# Patient Record
Sex: FEMALE | Race: WHITE | Employment: OTHER | URBAN - METROPOLITAN AREA
[De-identification: names, ages, dates, MRNs, and addresses within clinical notes are randomized per-mention and may not be internally consistent; named-entity substitution may affect disease eponyms.]

---

## 2019-03-27 ENCOUNTER — APPOINTMENT (OUTPATIENT)
Dept: GENERAL RADIOLOGY | Facility: CLINIC | Age: 84
End: 2019-03-27
Attending: EMERGENCY MEDICINE
Payer: COMMERCIAL

## 2019-03-27 ENCOUNTER — HOSPITAL ENCOUNTER (EMERGENCY)
Facility: CLINIC | Age: 84
Discharge: HOME | End: 2019-03-27
Attending: EMERGENCY MEDICINE | Admitting: EMERGENCY MEDICINE
Payer: COMMERCIAL

## 2019-03-27 VITALS
WEIGHT: 165 LBS | SYSTOLIC BLOOD PRESSURE: 146 MMHG | BODY MASS INDEX: 31.15 KG/M2 | HEIGHT: 61 IN | DIASTOLIC BLOOD PRESSURE: 97 MMHG | RESPIRATION RATE: 16 BRPM | OXYGEN SATURATION: 97 % | HEART RATE: 93 BPM | TEMPERATURE: 98.1 F

## 2019-03-27 DIAGNOSIS — J10.1 INFLUENZA A: ICD-10-CM

## 2019-03-27 LAB
ALBUMIN SERPL-MCNC: 3.6 G/DL (ref 3.4–5)
ALP SERPL-CCNC: 84 U/L (ref 40–150)
ALT SERPL W P-5'-P-CCNC: 17 U/L (ref 0–50)
ANION GAP SERPL CALCULATED.3IONS-SCNC: 8 MMOL/L (ref 3–14)
AST SERPL W P-5'-P-CCNC: 22 U/L (ref 0–45)
BASOPHILS # BLD AUTO: 0 10E9/L (ref 0–0.2)
BASOPHILS NFR BLD AUTO: 0.2 %
BILIRUB SERPL-MCNC: 0.7 MG/DL (ref 0.2–1.3)
BUN SERPL-MCNC: 14 MG/DL (ref 7–30)
CALCIUM SERPL-MCNC: 8.7 MG/DL (ref 8.5–10.1)
CHLORIDE SERPL-SCNC: 97 MMOL/L (ref 94–109)
CO2 SERPL-SCNC: 28 MMOL/L (ref 20–32)
CREAT SERPL-MCNC: 0.73 MG/DL (ref 0.52–1.04)
DIFFERENTIAL METHOD BLD: ABNORMAL
EOSINOPHIL # BLD AUTO: 0 10E9/L (ref 0–0.7)
EOSINOPHIL NFR BLD AUTO: 0.2 %
ERYTHROCYTE [DISTWIDTH] IN BLOOD BY AUTOMATED COUNT: 12.2 % (ref 10–15)
FLUAV+FLUBV AG SPEC QL: NEGATIVE
FLUAV+FLUBV AG SPEC QL: POSITIVE
GFR SERPL CREATININE-BSD FRML MDRD: 76 ML/MIN/{1.73_M2}
GLUCOSE SERPL-MCNC: 111 MG/DL (ref 70–99)
HCT VFR BLD AUTO: 37.1 % (ref 35–47)
HGB BLD-MCNC: 12.9 G/DL (ref 11.7–15.7)
IMM GRANULOCYTES # BLD: 0 10E9/L (ref 0–0.4)
IMM GRANULOCYTES NFR BLD: 0.2 %
INTERPRETATION ECG - MUSE: NORMAL
LYMPHOCYTES # BLD AUTO: 0.4 10E9/L (ref 0.8–5.3)
LYMPHOCYTES NFR BLD AUTO: 9.8 %
MCH RBC QN AUTO: 31.6 PG (ref 26.5–33)
MCHC RBC AUTO-ENTMCNC: 34.8 G/DL (ref 31.5–36.5)
MCV RBC AUTO: 91 FL (ref 78–100)
MONOCYTES # BLD AUTO: 0.3 10E9/L (ref 0–1.3)
MONOCYTES NFR BLD AUTO: 6.2 %
NEUTROPHILS # BLD AUTO: 3.7 10E9/L (ref 1.6–8.3)
NEUTROPHILS NFR BLD AUTO: 83.4 %
NRBC # BLD AUTO: 0 10*3/UL
NRBC BLD AUTO-RTO: 0 /100
PLATELET # BLD AUTO: 198 10E9/L (ref 150–450)
POTASSIUM SERPL-SCNC: 3.1 MMOL/L (ref 3.4–5.3)
PROT SERPL-MCNC: 7.3 G/DL (ref 6.8–8.8)
RBC # BLD AUTO: 4.08 10E12/L (ref 3.8–5.2)
SODIUM SERPL-SCNC: 133 MMOL/L (ref 133–144)
SPECIMEN SOURCE: ABNORMAL
TROPONIN I SERPL-MCNC: 0.04 UG/L (ref 0–0.04)
WBC # BLD AUTO: 4.4 10E9/L (ref 4–11)

## 2019-03-27 PROCEDURE — 71046 X-RAY EXAM CHEST 2 VIEWS: CPT

## 2019-03-27 PROCEDURE — 80053 COMPREHEN METABOLIC PANEL: CPT | Performed by: EMERGENCY MEDICINE

## 2019-03-27 PROCEDURE — 85025 COMPLETE CBC W/AUTO DIFF WBC: CPT | Performed by: EMERGENCY MEDICINE

## 2019-03-27 PROCEDURE — 94640 AIRWAY INHALATION TREATMENT: CPT

## 2019-03-27 PROCEDURE — 25000125 ZZHC RX 250: Performed by: EMERGENCY MEDICINE

## 2019-03-27 PROCEDURE — 93005 ELECTROCARDIOGRAM TRACING: CPT

## 2019-03-27 PROCEDURE — 25000132 ZZH RX MED GY IP 250 OP 250 PS 637: Performed by: EMERGENCY MEDICINE

## 2019-03-27 PROCEDURE — 99285 EMERGENCY DEPT VISIT HI MDM: CPT | Mod: 25

## 2019-03-27 PROCEDURE — 84484 ASSAY OF TROPONIN QUANT: CPT | Performed by: EMERGENCY MEDICINE

## 2019-03-27 PROCEDURE — 87804 INFLUENZA ASSAY W/OPTIC: CPT | Mod: 91 | Performed by: EMERGENCY MEDICINE

## 2019-03-27 RX ORDER — OSELTAMIVIR PHOSPHATE 75 MG/1
75 CAPSULE ORAL ONCE
Status: COMPLETED | OUTPATIENT
Start: 2019-03-27 | End: 2019-03-27

## 2019-03-27 RX ORDER — IPRATROPIUM BROMIDE AND ALBUTEROL SULFATE 2.5; .5 MG/3ML; MG/3ML
3 SOLUTION RESPIRATORY (INHALATION) ONCE
Status: COMPLETED | OUTPATIENT
Start: 2019-03-27 | End: 2019-03-27

## 2019-03-27 RX ORDER — ACETAMINOPHEN 500 MG
1000 TABLET ORAL ONCE
Status: COMPLETED | OUTPATIENT
Start: 2019-03-27 | End: 2019-03-27

## 2019-03-27 RX ORDER — OSELTAMIVIR PHOSPHATE 75 MG/1
75 CAPSULE ORAL 2 TIMES DAILY
Qty: 9 CAPSULE | Refills: 0 | Status: SHIPPED | OUTPATIENT
Start: 2019-03-27 | End: 2019-04-01

## 2019-03-27 RX ORDER — POTASSIUM CHLORIDE 1.5 G/1.58G
40 POWDER, FOR SOLUTION ORAL ONCE
Status: COMPLETED | OUTPATIENT
Start: 2019-03-27 | End: 2019-03-27

## 2019-03-27 RX ORDER — ALBUTEROL SULFATE 0.83 MG/ML
2.5 SOLUTION RESPIRATORY (INHALATION) EVERY 4 HOURS PRN
Qty: 1 BOX | Refills: 0 | Status: SHIPPED | OUTPATIENT
Start: 2019-03-27 | End: 2019-04-26

## 2019-03-27 RX ADMIN — POTASSIUM CHLORIDE 40 MEQ: 1.5 POWDER, FOR SOLUTION ORAL at 14:31

## 2019-03-27 RX ADMIN — OSELTAMIVIR PHOSPHATE 75 MG: 75 CAPSULE ORAL at 14:31

## 2019-03-27 RX ADMIN — IPRATROPIUM BROMIDE AND ALBUTEROL SULFATE 3 ML: .5; 2.5 SOLUTION RESPIRATORY (INHALATION) at 14:19

## 2019-03-27 RX ADMIN — ACETAMINOPHEN 1000 MG: 500 TABLET, FILM COATED ORAL at 14:30

## 2019-03-27 ASSESSMENT — ENCOUNTER SYMPTOMS
ABDOMINAL PAIN: 0
MYALGIAS: 1
RHINORRHEA: 1
VOMITING: 1
FATIGUE: 1
HEADACHES: 1
NAUSEA: 1
SORE THROAT: 1
COUGH: 1

## 2019-03-27 ASSESSMENT — MIFFLIN-ST. JEOR: SCORE: 1145.82

## 2019-03-27 NOTE — ED PROVIDER NOTES
"  History     Chief Complaint:  Headache     HPI   Michelle Retana is a 83 year old female, with a history of hypertension, hyperlipidemia, and heart murmur, who presents with headache. The patient states that she has been having headache, nausea, myalgia, sore throat, rhinorrhea, cough, and 1 episode of vomiting beginning today. The patient denies any shortness of breath, chest pain, abdominal pain, or ear pain. The patient did take DayQuil prior to arrival.     Allergies:  Azithromycin      Medications:    Amlodipine  Aspirin  Levothyroxine  Cozaar  Metoprolol    Past Medical History:    Hyperlipidemia  Heart murmur  Hypertension  Impaired glucose tolerance  Myelopathy  Reflux esophagitis  Thyroid disease  CHF    Past Surgical History:    Appendectomy  C6-7 cervical laminectomy  Repair rectocele  Vaginal hysterectomy  Carpal tunnel release right  Arthroplasty right thumb  Phacoemulsification clear cornea with standard intraocular lens implant    Family History:    No past pertinent family history.     Social History:  Presents with her daughter   Former Smoker - quit 1984  Positive for alcohol use.   Marital Status:   [2]     Review of Systems   Constitutional: Positive for fatigue.   HENT: Positive for rhinorrhea and sore throat. Negative for ear pain.    Respiratory: Positive for cough.    Gastrointestinal: Positive for nausea and vomiting. Negative for abdominal pain.   Musculoskeletal: Positive for myalgias.   Neurological: Positive for headaches.   All other systems reviewed and are negative.      Physical Exam   First Vitals:  BP: 114/62  Pulse: 93  Heart Rate: 50  Temp: 98.1  F (36.7  C)  Resp: 16  Height: 154.9 cm (5' 1\")  Weight: 74.8 kg (165 lb)  SpO2: 92 %    Physical Exam  Constitutional: vitals reviewed  HENT: Moist oral mucosa.  Posterior oropharynx without significant erythema or edema.  Eyes: Grossly normal vision. Pupils are equal and round. Extraocular movements intact.  Sclera clear and " without icterus.  Cardiovascular: Normal rate. Regular rhythm. S1 and S2 without audible murmurs. 2+ radial pulses. Normal capillary refill.  Respiratory: Effort normal.  Mild bilateral expiratory wheezing.  Speaking full sentences.  Gastrointestinal: Soft. No distension. No tenderness to palpation. No rebound or guarding.  Neurologic: Alert and awake. Coordinated movement of extremities. Speech normal.  Skin: No diaphoresis, rashes, ecchymoses, or lesions.  Musculoskeletal: No lower extremity edema. No gross deformity. No joint swelling.  Psychiatric: Appropriate affect. Behavior is normal. Intact recent and remote memory. Linear thought process.      Emergency Department Course   ECG:   Indication: headache  Time: 1330  Vent. Rate 84 bpm. CO interval 174. QRS duration 96. QT/QTc 372/439. P-R-T axis 57 62 52. Sinus rhythm with premature atrial complexes. Incomplete right bundle branch block. Possible inferior infarct, age undetermined. Abnormal ECG. Read time: 1356.      Imaging:   Radiographic findings were communicated with the patient who voiced understanding of the findings.   XR Chest 2 views:   No acute cardiopulmonary abnormality. As per radiology.     Laboratory:  CBC: WBC: 4.4, HGB: 12.9, PLT: 198  CMP: Glucose 111 (H), Potassium: 3.1 (L), o/w WNL (Creatinine: 0.73)    Influenza A/B: Influenza A positive    Troponin: 0.039    Interventions:   1419 Duoneb, 3 mL, nebulization  1430 Tylenol, 1000 mg, PO  1431 Tamiflu, 75 mg, PO  1431 Potassium chloride, 40 mEq, PO     Emergency Department Course:  Nursing notes and vitals reviewed.     1350  I performed an exam of the patient as documented above.     IV inserted. Medicine administered as documented above. Blood drawn. This was sent to the lab for further testing, results above.    The patient was sent for a chest XR while in the emergency department, findings above.     1513 I rechecked the patient and discussed the results of her workup thus far.      Findings and plan explained to the Patient. Patient discharged home with instructions regarding supportive care, medications, and reasons to return. The importance of close follow-up was reviewed. The patient was prescribed Albuterol, nebulizer, and Tamiflu.     I personally reviewed the laboratory results with the Patient and answered all related questions prior to discharge.          Impression & Plan      Medical Decision Making:  Patient who presents with 1 day of symptoms of a flulike illness.  Labs done from triage are positive for influenza A.  She also has mild hypokalemia.  She was given a DuoNeb, Tylenol, oseltamavir, oral potassium repletion in the emergency department.  She had significant improvement in her symptoms, especially from the DuoNeb.  I feel that outpatient management is appropriate.  A chest x-ray was negative.  She will complete 5 days of oseltamavir.  Albuterol nebulizer solution was prescribed and she was provided with a nebulizer kit.  Follow-up with primary care.  Return precautions for any worsening or new concerning symptoms.  She left the emergency department in improved condition.    Diagnosis:    ICD-10-CM   1. Influenza A J10.1       Disposition:  discharged to home    Discharge Medications:   Details   albuterol (PROVENTIL) (2.5 MG/3ML) 0.083% neb solution Take 1 vial (2.5 mg) by nebulization every 4 hours as needed for shortness of breath / dyspnea or wheezing  Qty: 1 Box, Refills: 0   nebulizer (ADULT) device/mask kit Inhale 1 kit into the lungs every 4 hours as needed (wheezing)  Qty: 1 kit, Refills: 3   oseltamivir (TAMIFLU) 75 MG capsule Take 1 capsule (75 mg) by mouth 2 times daily for 5 days  Qty: 9 capsule, Refills: 0       IHetal, maria c serving as a scribe on 3/27/2019 at 1:41 PM to personally document services performed by Dann Mcdonough MD based on my observations and the provider's statements to me.      Hetal Hsu  3/27/2019    EMERGENCY  DEPARTMENT       Dann Mcdonough MD  03/28/19 0756

## 2019-03-27 NOTE — ED AVS SNAPSHOT
Emergency Department  [address] [place] [address]  Phone:  [phone]  Fax:  [phone]                                    Michelle Retana   MRN: [id number]    Department:   Emergency Department   Date of Visit:  3/27/2019           After Visit Summary Signature Page    I have received my discharge instructions, and my questions have been answered. I have discussed any challenges I see with this plan with the nurse or doctor.    ..........................................................................................................................................  Patient/Patient Representative Signature      ..........................................................................................................................................  Patient Representative Print Name and Relationship to Patient    ..................................................               ................................................  Date                                   Time    ..........................................................................................................................................  Reviewed by Signature/Title    ...................................................              ..............................................  Date                                               Time          22EPIC Rev 08/18

## 2020-11-04 ENCOUNTER — TRANSFERRED RECORDS (OUTPATIENT)
Dept: HEALTH INFORMATION MANAGEMENT | Facility: CLINIC | Age: 85
End: 2020-11-04

## 2020-11-05 LAB — EJECTION FRACTION: NORMAL %

## 2023-04-20 ENCOUNTER — NURSE TRIAGE (OUTPATIENT)
Dept: FAMILY MEDICINE | Facility: CLINIC | Age: 88
End: 2023-04-20

## 2023-04-20 NOTE — TELEPHONE ENCOUNTER
[facility] states pt has appt next Tuesday, but feels dizziness is a little worse and maybe should be seen sooner.      Nurse Triage SBAR    Is this a 2nd Level Triage? NO    Situation:  Charles (verbal ctc) calling requesting a clinic appt for pt to be seen for increased Vertigo. He states pt is seen at [facility] and has an appt next Tuesday. He states he contacted [facility] and unable to get pt in prior. Pt is not an established [facility] patient. Pt triaged- protocol to be seen in office today. Charles states that pt is usually seen at [facility] in [place]. Writer advised Charles to call this clinic for further evaluation or seek attention at     Background: Hx of vertigo    Assessment: See below    Protocol Recommended Disposition:   Go To Office Now    Recommendation:   Pt given telephone number for [facility] per Charles. He verbalized understanding to seek attention at PCP or UC for further evaluation.    Routed to provider    Does the patient meet one of the following criteria for ADS visit consideration? 16+ years old, with an [facility] PCP     TIP  Providers, please consider if this condition is appropriate for management at one of our Acute and Diagnostic Services sites.     If patient is a good candidate, please use dotphrase <dot>triageresponse and select Refer to ADS to document.      Reason for Disposition    Spinning or tilting sensation (vertigo) present now    Additional Information    Negative: SEVERE difficulty breathing (e.g., struggling for each breath, speaks in single words)    Negative: Shock suspected (e.g., cold/pale/clammy skin, too weak to stand, low BP, rapid pulse)    Negative: Difficult to awaken or acting confused (e.g., disoriented, slurred speech)    Negative: Fainted, and still feels dizzy afterwards    Negative: Overdose (accidental or intentional) of medications    Negative: New neurologic deficit that is present now: * Weakness of the face, arm, or leg on one side  of the body * Numbness of the face, arm, or leg on one side of the body * Loss of speech or garbled speech    Negative: Heart beating < 50 beats per minute OR > 140 beats per minute    Negative: Sounds like a life-threatening emergency to the triager    Negative: Chest pain    Negative: Rectal bleeding, bloody stool, or tarry-black stool    Negative: Vomiting is main symptom    Negative: Diarrhea is main symptom    Negative: Headache is main symptom    Negative: Heat exhaustion suspected (i.e., dehydration from heat exposure)    Negative: Patient states that they are having an anxiety or panic attack    Negative: Dizziness from low blood sugar (i.e., < 60 mg/dl or 3.5 mmol/l)    Negative: SEVERE dizziness (e.g., unable to stand, requires support to walk, feels like passing out now)    Negative: SEVERE headache or neck pain    Negative: Spinning or tilting sensation (vertigo) present now and one or more stroke risk factors (i.e., hypertension, diabetes mellitus, prior stroke/TIA, heart attack, age over 60) (Exception: prior physician evaluation for this AND no different/worse than usual)    Negative: Neurologic deficit that was brief (now gone), ANY of the following:* Weakness of the face, arm, or leg on one side of the body* Numbness of the face, arm, or leg on one side of the body* Loss of speech or garbled speech    Negative: Loss of vision or double vision  (Exception: Similar to previous migraines.)    Negative: Extra heart beats OR irregular heart beating (i.e., 'palpitations')    Negative: Difficulty breathing    Negative: Drinking very little and has signs of dehydration (e.g., no urine > 12 hours, very dry mouth, very lightheaded)    Negative: Follows bleeding (e.g., stomach, rectum, vagina)  (Exception: Became dizzy from sight of small amount blood.)    Negative: Patient sounds very sick or weak to the triager    Negative: Lightheadedness (dizziness) present now, after 2 hours of rest and fluids    Answer  "Assessment - Initial Assessment Questions  1. DESCRIPTION: \"Describe your dizziness.\"      Difficulty with balance, Vertigo     2. LIGHTHEADED: \"Do you feel lightheaded?\" (e.g., somewhat faint, woozy, weak upon standing)    No     3. VERTIGO: \"Do you feel like either you or the room is spinning or tilting?\" (i.e. vertigo)     Yes, dx with vertigo    4. SEVERITY: \"How bad is it?\"  \"Do you feel like you are going to faint?\" \"Can you stand and walk?\"    - MILD: Feels slightly dizzy, but walking normally.    - MODERATE: Feels unsteady when walking, but not falling; interferes with normal activities (e.g., school, work).    - SEVERE: Unable to walk without falling, or requires assistance to walk without falling; feels like passing out now.        Moderate     5. ONSET:  \"When did the dizziness begin?\"      This morning    6. AGGRAVATING FACTORS: \"Does anything make it worse?\" (e.g., standing, change in head position)      Stays the same    7. HEART RATE: \"Can you tell me your heart rate?\" \"How many beats in 15 seconds?\"  (Note: not all patients can do this)        NA    8. CAUSE: \"What do you think is causing the dizziness?\"      Vertigo    9. RECURRENT SYMPTOM: \"Have you had dizziness before?\" If Yes, ask: \"When was the last time?\" \"What happened that time?\"       Yes    10. OTHER SYMPTOMS: \"Do you have any other symptoms?\" (e.g., fever, chest pain, vomiting, diarrhea, bleeding)        No    11. PREGNANCY: \"Is there any chance you are pregnant?\" \"When was your last menstrual period?\"        No    Protocols used: DIZZINESS-A-OH    GO TO OFFICE NOW:  * You need to be examined. Come into the office right now.   * IF NO AVAILABLE APPOINTMENTS: You need to be seen in an Urgent Care Center. Go to the one at . Leave now. A nearby Urgent Care Center is often a good source of care. Another choice is to go to the Emergency Department.      DRINK FLUIDS:  * Drink several glasses of fruit juice, other clear fluids or water.  * This " will improve hydration and blood glucose.  * If the weather is hot or you have a fever, make sure the fluids are cold.      SIT UP SLOWLY BEFORE STANDING:  * In the mornings, sit up for a few minutes before you stand up. That will help your circulation make the adjustment.  * If you have to stand up for a long period, contract and relax your leg muscles to help pump the blood back to the heart.  * Sit down or lie down if you feel dizzy.      CALL BACK IF:  * After 2 hours of rest and fluids AND still feeling dizzy.  * Passes out (faints).  * You become worse.        Patient/Caregiver understands and will follow care advice? Yes, plans to follow advice     KIARA Bey [facility] Triage Team